# Patient Record
Sex: MALE | Race: WHITE | NOT HISPANIC OR LATINO | Employment: FULL TIME | URBAN - METROPOLITAN AREA
[De-identification: names, ages, dates, MRNs, and addresses within clinical notes are randomized per-mention and may not be internally consistent; named-entity substitution may affect disease eponyms.]

---

## 2022-06-02 ENCOUNTER — APPOINTMENT (OUTPATIENT)
Dept: ADMISSIONS | Facility: MEDICAL CENTER | Age: 62
End: 2022-06-02
Payer: COMMERCIAL

## 2022-06-06 ENCOUNTER — PRE-ADMISSION TESTING (OUTPATIENT)
Dept: ADMISSIONS | Facility: MEDICAL CENTER | Age: 62
End: 2022-06-06
Attending: ORTHOPAEDIC SURGERY
Payer: COMMERCIAL

## 2022-06-06 RX ORDER — FLUTICASONE PROPIONATE AND SALMETEROL 100; 50 UG/1; UG/1
1 POWDER RESPIRATORY (INHALATION) DAILY
COMMUNITY

## 2022-06-06 RX ORDER — ALBUTEROL SULFATE 90 UG/1
2 AEROSOL, METERED RESPIRATORY (INHALATION) EVERY 6 HOURS PRN
COMMUNITY

## 2022-06-06 NOTE — PREPROCEDURE INSTRUCTIONS
Pre-admit appointment completed.  Pt instructed to continue regularly prescribed medications through the day before surgery. Pt instructed to take the following medications the day of surgery with a sip of water, per anesthesia protocol; advair, and if needed-albuterol MDI (to bring DOS).     MET's=>4.

## 2022-06-17 ENCOUNTER — HOSPITAL ENCOUNTER (OUTPATIENT)
Facility: MEDICAL CENTER | Age: 62
End: 2022-06-17
Attending: ORTHOPAEDIC SURGERY | Admitting: ORTHOPAEDIC SURGERY
Payer: COMMERCIAL

## 2022-06-17 ENCOUNTER — ANESTHESIA (OUTPATIENT)
Dept: SURGERY | Facility: MEDICAL CENTER | Age: 62
End: 2022-06-17
Payer: COMMERCIAL

## 2022-06-17 ENCOUNTER — ANESTHESIA EVENT (OUTPATIENT)
Dept: SURGERY | Facility: MEDICAL CENTER | Age: 62
End: 2022-06-17
Payer: COMMERCIAL

## 2022-06-17 VITALS
HEIGHT: 69 IN | OXYGEN SATURATION: 94 % | TEMPERATURE: 97.5 F | HEART RATE: 75 BPM | WEIGHT: 200.62 LBS | SYSTOLIC BLOOD PRESSURE: 129 MMHG | BODY MASS INDEX: 29.71 KG/M2 | RESPIRATION RATE: 16 BRPM | DIASTOLIC BLOOD PRESSURE: 90 MMHG

## 2022-06-17 PROCEDURE — C1713 ANCHOR/SCREW BN/BN,TIS/BN: HCPCS | Performed by: ORTHOPAEDIC SURGERY

## 2022-06-17 PROCEDURE — 700111 HCHG RX REV CODE 636 W/ 250 OVERRIDE (IP): Performed by: INTERNAL MEDICINE

## 2022-06-17 PROCEDURE — 76942 ECHO GUIDE FOR BIOPSY: CPT | Mod: 26 | Performed by: INTERNAL MEDICINE

## 2022-06-17 PROCEDURE — 160029 HCHG SURGERY MINUTES - 1ST 30 MINS LEVEL 4: Performed by: ORTHOPAEDIC SURGERY

## 2022-06-17 PROCEDURE — 700105 HCHG RX REV CODE 258: Performed by: ORTHOPAEDIC SURGERY

## 2022-06-17 PROCEDURE — A9270 NON-COVERED ITEM OR SERVICE: HCPCS | Performed by: INTERNAL MEDICINE

## 2022-06-17 PROCEDURE — 64415 NJX AA&/STRD BRCH PLXS IMG: CPT | Performed by: ORTHOPAEDIC SURGERY

## 2022-06-17 PROCEDURE — 01638 ANES OPN/ARTHR TOT SHO RPLCM: CPT | Performed by: INTERNAL MEDICINE

## 2022-06-17 PROCEDURE — 64415 NJX AA&/STRD BRCH PLXS IMG: CPT | Mod: 59 | Performed by: INTERNAL MEDICINE

## 2022-06-17 PROCEDURE — C1762 CONN TISS, HUMAN(INC FASCIA): HCPCS | Performed by: ORTHOPAEDIC SURGERY

## 2022-06-17 PROCEDURE — 160046 HCHG PACU - 1ST 60 MINS PHASE II: Performed by: ORTHOPAEDIC SURGERY

## 2022-06-17 PROCEDURE — 160035 HCHG PACU - 1ST 60 MINS PHASE I: Performed by: ORTHOPAEDIC SURGERY

## 2022-06-17 PROCEDURE — 160002 HCHG RECOVERY MINUTES (STAT): Performed by: ORTHOPAEDIC SURGERY

## 2022-06-17 PROCEDURE — 160048 HCHG OR STATISTICAL LEVEL 1-5: Performed by: ORTHOPAEDIC SURGERY

## 2022-06-17 PROCEDURE — 700102 HCHG RX REV CODE 250 W/ 637 OVERRIDE(OP): Performed by: INTERNAL MEDICINE

## 2022-06-17 PROCEDURE — 700101 HCHG RX REV CODE 250: Performed by: INTERNAL MEDICINE

## 2022-06-17 PROCEDURE — 160041 HCHG SURGERY MINUTES - EA ADDL 1 MIN LEVEL 4: Performed by: ORTHOPAEDIC SURGERY

## 2022-06-17 PROCEDURE — 700111 HCHG RX REV CODE 636 W/ 250 OVERRIDE (IP): Performed by: ORTHOPAEDIC SURGERY

## 2022-06-17 PROCEDURE — 160009 HCHG ANES TIME/MIN: Performed by: ORTHOPAEDIC SURGERY

## 2022-06-17 PROCEDURE — 160025 RECOVERY II MINUTES (STATS): Performed by: ORTHOPAEDIC SURGERY

## 2022-06-17 PROCEDURE — 502000 HCHG MISC OR IMPLANTS RC 0278: Performed by: ORTHOPAEDIC SURGERY

## 2022-06-17 DEVICE — ANCHOR KNOTLESS REELX STT 4.5MM (5EA/BX): Type: IMPLANTABLE DEVICE | Site: SHOULDER | Status: FUNCTIONAL

## 2022-06-17 DEVICE — ANCHOR SUTURE OMEGA PEEK OD4.75 MM 2 KNOTLESS STERILE LATEX FREE: Type: IMPLANTABLE DEVICE | Site: SHOULDER | Status: FUNCTIONAL

## 2022-06-17 DEVICE — ANCHOR SUTURE ICONIX 3 WITH 3 STRANDS #2 FORCE FIBER 2.3MM (5EA/BX): Type: IMPLANTABLE DEVICE | Site: SHOULDER | Status: FUNCTIONAL

## 2022-06-17 DEVICE — GRAFT SOFT TISSUE DERMAL MATRIX ALLOMEND 4X8CM: Type: IMPLANTABLE DEVICE | Site: SHOULDER | Status: FUNCTIONAL

## 2022-06-17 DEVICE — IMPLANTABLE DEVICE: Type: IMPLANTABLE DEVICE | Site: SHOULDER | Status: FUNCTIONAL

## 2022-06-17 RX ORDER — LIDOCAINE HYDROCHLORIDE 20 MG/ML
INJECTION, SOLUTION EPIDURAL; INFILTRATION; INTRACAUDAL; PERINEURAL PRN
Status: DISCONTINUED | OUTPATIENT
Start: 2022-06-17 | End: 2022-06-17 | Stop reason: SURG

## 2022-06-17 RX ORDER — HYDROMORPHONE HYDROCHLORIDE 2 MG/ML
0.4 INJECTION, SOLUTION INTRAMUSCULAR; INTRAVENOUS; SUBCUTANEOUS
Status: DISCONTINUED | OUTPATIENT
Start: 2022-06-17 | End: 2022-06-17 | Stop reason: HOSPADM

## 2022-06-17 RX ORDER — ONDANSETRON 2 MG/ML
INJECTION INTRAMUSCULAR; INTRAVENOUS PRN
Status: DISCONTINUED | OUTPATIENT
Start: 2022-06-17 | End: 2022-06-17 | Stop reason: SURG

## 2022-06-17 RX ORDER — CEFAZOLIN SODIUM 1 G/3ML
INJECTION, POWDER, FOR SOLUTION INTRAMUSCULAR; INTRAVENOUS PRN
Status: DISCONTINUED | OUTPATIENT
Start: 2022-06-17 | End: 2022-06-17 | Stop reason: SURG

## 2022-06-17 RX ORDER — ROPIVACAINE HYDROCHLORIDE 5 MG/ML
INJECTION, SOLUTION EPIDURAL; INFILTRATION; PERINEURAL
Status: COMPLETED | OUTPATIENT
Start: 2022-06-17 | End: 2022-06-17

## 2022-06-17 RX ORDER — HYDROMORPHONE HYDROCHLORIDE 2 MG/ML
0.2 INJECTION, SOLUTION INTRAMUSCULAR; INTRAVENOUS; SUBCUTANEOUS
Status: DISCONTINUED | OUTPATIENT
Start: 2022-06-17 | End: 2022-06-17 | Stop reason: HOSPADM

## 2022-06-17 RX ORDER — MEPERIDINE HYDROCHLORIDE 50 MG/ML
12.5 INJECTION INTRAMUSCULAR; INTRAVENOUS; SUBCUTANEOUS
Status: DISCONTINUED | OUTPATIENT
Start: 2022-06-17 | End: 2022-06-17 | Stop reason: HOSPADM

## 2022-06-17 RX ORDER — DEXAMETHASONE SODIUM PHOSPHATE 4 MG/ML
INJECTION, SOLUTION INTRA-ARTICULAR; INTRALESIONAL; INTRAMUSCULAR; INTRAVENOUS; SOFT TISSUE PRN
Status: DISCONTINUED | OUTPATIENT
Start: 2022-06-17 | End: 2022-06-17 | Stop reason: SURG

## 2022-06-17 RX ORDER — ONDANSETRON 2 MG/ML
4 INJECTION INTRAMUSCULAR; INTRAVENOUS
Status: DISCONTINUED | OUTPATIENT
Start: 2022-06-17 | End: 2022-06-17 | Stop reason: HOSPADM

## 2022-06-17 RX ORDER — HALOPERIDOL 5 MG/ML
1 INJECTION INTRAMUSCULAR
Status: DISCONTINUED | OUTPATIENT
Start: 2022-06-17 | End: 2022-06-17 | Stop reason: HOSPADM

## 2022-06-17 RX ORDER — CELECOXIB 200 MG/1
200 CAPSULE ORAL ONCE
Status: COMPLETED | OUTPATIENT
Start: 2022-06-17 | End: 2022-06-17

## 2022-06-17 RX ORDER — DIPHENHYDRAMINE HYDROCHLORIDE 50 MG/ML
12.5 INJECTION INTRAMUSCULAR; INTRAVENOUS
Status: DISCONTINUED | OUTPATIENT
Start: 2022-06-17 | End: 2022-06-17 | Stop reason: HOSPADM

## 2022-06-17 RX ORDER — EPINEPHRINE 1 MG/ML(1)
AMPUL (ML) INJECTION
Status: DISCONTINUED | OUTPATIENT
Start: 2022-06-17 | End: 2022-06-17 | Stop reason: HOSPADM

## 2022-06-17 RX ORDER — ACETAMINOPHEN 500 MG
1000 TABLET ORAL ONCE
Status: COMPLETED | OUTPATIENT
Start: 2022-06-17 | End: 2022-06-17

## 2022-06-17 RX ORDER — SODIUM CHLORIDE, SODIUM LACTATE, POTASSIUM CHLORIDE, CALCIUM CHLORIDE 600; 310; 30; 20 MG/100ML; MG/100ML; MG/100ML; MG/100ML
INJECTION, SOLUTION INTRAVENOUS CONTINUOUS
Status: ACTIVE | OUTPATIENT
Start: 2022-06-17 | End: 2022-06-17

## 2022-06-17 RX ORDER — KETOROLAC TROMETHAMINE 30 MG/ML
INJECTION, SOLUTION INTRAMUSCULAR; INTRAVENOUS PRN
Status: DISCONTINUED | OUTPATIENT
Start: 2022-06-17 | End: 2022-06-17 | Stop reason: SURG

## 2022-06-17 RX ORDER — OXYCODONE HCL 5 MG/5 ML
5 SOLUTION, ORAL ORAL
Status: DISCONTINUED | OUTPATIENT
Start: 2022-06-17 | End: 2022-06-17 | Stop reason: HOSPADM

## 2022-06-17 RX ORDER — HYDROMORPHONE HYDROCHLORIDE 2 MG/ML
0.1 INJECTION, SOLUTION INTRAMUSCULAR; INTRAVENOUS; SUBCUTANEOUS
Status: DISCONTINUED | OUTPATIENT
Start: 2022-06-17 | End: 2022-06-17 | Stop reason: HOSPADM

## 2022-06-17 RX ORDER — PHENYLEPHRINE HYDROCHLORIDE 10 MG/ML
INJECTION, SOLUTION INTRAMUSCULAR; INTRAVENOUS; SUBCUTANEOUS PRN
Status: DISCONTINUED | OUTPATIENT
Start: 2022-06-17 | End: 2022-06-17 | Stop reason: SURG

## 2022-06-17 RX ORDER — OXYCODONE HCL 5 MG/5 ML
10 SOLUTION, ORAL ORAL
Status: DISCONTINUED | OUTPATIENT
Start: 2022-06-17 | End: 2022-06-17 | Stop reason: HOSPADM

## 2022-06-17 RX ORDER — MIDAZOLAM HYDROCHLORIDE 1 MG/ML
INJECTION INTRAMUSCULAR; INTRAVENOUS PRN
Status: DISCONTINUED | OUTPATIENT
Start: 2022-06-17 | End: 2022-06-17 | Stop reason: SURG

## 2022-06-17 RX ORDER — HYDRALAZINE HYDROCHLORIDE 20 MG/ML
5 INJECTION INTRAMUSCULAR; INTRAVENOUS
Status: DISCONTINUED | OUTPATIENT
Start: 2022-06-17 | End: 2022-06-17 | Stop reason: HOSPADM

## 2022-06-17 RX ORDER — ALBUTEROL SULFATE 2.5 MG/3ML
2.5 SOLUTION RESPIRATORY (INHALATION)
Status: DISCONTINUED | OUTPATIENT
Start: 2022-06-17 | End: 2022-06-17 | Stop reason: HOSPADM

## 2022-06-17 RX ADMIN — ONDANSETRON 4 MG: 2 INJECTION INTRAMUSCULAR; INTRAVENOUS at 11:58

## 2022-06-17 RX ADMIN — CELECOXIB 200 MG: 200 CAPSULE ORAL at 07:57

## 2022-06-17 RX ADMIN — MIDAZOLAM HYDROCHLORIDE 2 MG: 1 INJECTION, SOLUTION INTRAMUSCULAR; INTRAVENOUS at 09:38

## 2022-06-17 RX ADMIN — ACETAMINOPHEN 1000 MG: 500 TABLET ORAL at 07:57

## 2022-06-17 RX ADMIN — SODIUM CHLORIDE, POTASSIUM CHLORIDE, SODIUM LACTATE AND CALCIUM CHLORIDE: 600; 310; 30; 20 INJECTION, SOLUTION INTRAVENOUS at 07:54

## 2022-06-17 RX ADMIN — PHENYLEPHRINE HYDROCHLORIDE 100 MCG: 10 INJECTION INTRAVENOUS at 10:28

## 2022-06-17 RX ADMIN — PROPOFOL 200 MG: 10 INJECTION, EMULSION INTRAVENOUS at 09:53

## 2022-06-17 RX ADMIN — CEFAZOLIN 2 G: 330 INJECTION, POWDER, FOR SOLUTION INTRAMUSCULAR; INTRAVENOUS at 09:56

## 2022-06-17 RX ADMIN — DEXAMETHASONE SODIUM PHOSPHATE 8 MG: 4 INJECTION, SOLUTION INTRA-ARTICULAR; INTRALESIONAL; INTRAMUSCULAR; INTRAVENOUS; SOFT TISSUE at 09:53

## 2022-06-17 RX ADMIN — FENTANYL CITRATE 100 MCG: 50 INJECTION, SOLUTION INTRAMUSCULAR; INTRAVENOUS at 09:53

## 2022-06-17 RX ADMIN — FENTANYL CITRATE 50 MCG: 50 INJECTION, SOLUTION INTRAMUSCULAR; INTRAVENOUS at 09:38

## 2022-06-17 RX ADMIN — SODIUM CHLORIDE, POTASSIUM CHLORIDE, SODIUM LACTATE AND CALCIUM CHLORIDE: 600; 310; 30; 20 INJECTION, SOLUTION INTRAVENOUS at 11:02

## 2022-06-17 RX ADMIN — ROPIVACAINE HYDROCHLORIDE 25 ML: 5 INJECTION, SOLUTION EPIDURAL; INFILTRATION; PERINEURAL at 09:38

## 2022-06-17 RX ADMIN — KETOROLAC TROMETHAMINE 30 MG: 30 INJECTION, SOLUTION INTRAMUSCULAR at 11:58

## 2022-06-17 RX ADMIN — PHENYLEPHRINE HYDROCHLORIDE 100 MCG: 10 INJECTION INTRAVENOUS at 10:57

## 2022-06-17 RX ADMIN — LIDOCAINE HYDROCHLORIDE 50 MG: 20 INJECTION, SOLUTION EPIDURAL; INFILTRATION; INTRACAUDAL at 09:53

## 2022-06-17 ASSESSMENT — PAIN DESCRIPTION - PAIN TYPE: TYPE: SURGICAL PAIN

## 2022-06-17 NOTE — ANESTHESIA POSTPROCEDURE EVALUATION
Patient: Car Dunbar    Procedure Summary     Date: 06/17/22 Room / Location:  OR  / SURGERY [facility]    Anesthesia Start: 0948 Anesthesia Stop: 1217    Procedures:       ARTHROSCOPY, SHOULDER, WITH EXTENSIVE DEBRIDEMENT (Right Shoulder)      DECOMPRESSION, SHOULDER, ARTHROSCOPIC - SUBACROMIAL- SUPERIOR CAPSULAR RECONSTRUCTION (Right Shoulder)      ARTHROSCOPY, SHOULDER, WITH BICEPS TENODESIS (Right Shoulder)      ARTHROSCOPY, SHOULDER, WITH ROTATOR CUFF REPAIR (Right Shoulder) Diagnosis: (RIGHT SHOULDER RC TEAR)    Surgeons: Rebekah Spicer M.D. Responsible Provider: Avelino Lucio M.D.    Anesthesia Type: general, peripheral nerve block ASA Status: 2          Final Anesthesia Type: general, peripheral nerve block  Last vitals  BP   Blood Pressure: 133/84    Temp   36.6 °C (97.9 °F)    Pulse   74   Resp   18    SpO2   91 %      Anesthesia Post Evaluation    Patient location during evaluation: PACU  Patient participation: complete - patient participated  Level of consciousness: awake and alert    Airway patency: patent  Anesthetic complications: no  Cardiovascular status: hemodynamically stable  Respiratory status: acceptable  Hydration status: euvolemic    PONV: none          No complications documented.     Nurse Pain Score: 0 (NPRS)

## 2022-06-17 NOTE — ANESTHESIA TIME REPORT
Anesthesia Start and Stop Event Times     Date Time Event    6/17/2022 0937 Ready for Procedure     0948 Anesthesia Start     1217 Anesthesia Stop        Responsible Staff  06/17/22    Name Role Begin End    Avelino Lucio M.D. Anesth 0948 1217        Overtime Reason:  no overtime (within assigned shift)    Comments:

## 2022-06-17 NOTE — OP REPORT
DATE OF SERVICE:  06/17/2022     PREOPERATIVE DIAGNOSES:  Right shoulder impingement syndrome, large retracted   rotator cuff tear, SLAP lesion.     POSTOPERATIVE DIAGNOSES:  Right shoulder impingement syndrome, large retracted   rotator cuff tear, SLAP lesion.     PROCEDURES:  Right shoulder arthroscopy, subacromial decompression, labral   debridement, arthroscopic proximal biceps tenodesis, arthroscopic rotator cuff   repair with superior capsular reconstruction using acellular dermal   allograft.     SURGEON:  Rebekah Spicer MD     ASSISTANT:  Guido Boles CFA     ANESTHESIOLOGIST:  Avelino Lucio MD     TYPE OF ANESTHESIA:  General, with preoperative interscalene nerve block.     INTRAVENOUS FLUID:  1 liter crystalloid.     ESTIMATED BLOOD LOSS:  Minimal.     DRAINS:  None.     SPECIMENS:  None.     COMPLICATIONS:  None.     IMPLANTS:  Conifer Iconix anchors x2, ReelX anchors x3, Omega anchors x2,   alpha vent anchor x1, AlloSource ultra thick acellular dermal matrix.     REASON FOR PROCEDURE:  The patient is a 61-year-old male who sustained an   acute injury to his right shoulder earlier this year.  He continued to have   pain and weakness.  We decided to proceed with arthroscopy due to the large   retracted rotator cuff tear visible on plain x-ray.     OPERATION:  The patient was given a right interscalene nerve block by the   anesthesiologist before surgery.  Once back in the operating room, a breathing   tube was placed.  He was given 2 grams of IV Ancef.  He was placed in the   lateral decubitus position.  Care was taken to pad his axilla as well as all   bony prominences.  The right shoulder was then examined under anesthesia.  He   was noted to have mildly restricted range of motion.  With a gentle   manipulation, the range of motion improved to normal.  There was no   instability to the shoulder.  The right upper extremity was then prepped with   ChloraPrep and draped in standard sterile fashion.   It was then placed in the   Arthrex traction device.  Bony landmarks were drawn and a standard posterior   portal was established.  The arthroscope was then inserted into the   glenohumeral joint.  An anterosuperior cannula was placed in the rotator   interval, just beneath the biceps tendon.  A probe was inserted.  The   glenohumeral joint cartilage was essentially normal.  There was a superior   labral fraying with a widely exposed proximal biceps tendon through the large   superior rotator cuff tear.  The labrum was debrided superiorly.  The   subscapularis was noted to be intact.  The posterior superior labrum was   lightly debrided.  The arthroscope was then inserted into the subacromial   space.  A lateral portal was established.  There was a copious amount of   thickened, inflamed bursal tissue.  This was removed with the shaver.  Borders   of the acromion were defined.  A 5.5 mm resector was used to remove the   anterior curve as well as lateral edge.  Portals were switched.  Using a   standard cutting block technique from posterior to anterior, a subacromial   decompression was completed.  This created a nice, smooth surface to the   undersurface of the acromion.  Care was taken not to interrupt the integrity   of the coracoacromial ligament.  Next, the rotator cuff was inspected.  The   infraspinatus could be advanced by approximately 1 cm and repaired; however,   there was a significant defect involving the entire supraspinatus.  The tissue   was friable, and not reducible.  Thus, the decision was made to perform an   infraspinatus repair as well as a superior capsular reconstruction.  The   superior rim of the glenoid as well as the greater tuberosity were both   roughened to create a healing response for the graft and for the repair.  With   the arthroscope in the posterior portal, the biceps tendon was addressed   first.  The groove itself was roughened and then tenodesed with two 1.4 mm   luggage tag  sutures.  All four tails were placed into a ReelX anchor, which   was tapped, then expanded, nicely compressing the tendon down to the prepared   bone.  The biceps tendon itself was released and the proximal segment   debrided.  The superior rim of the glenoid was prepared by removing the soft   tissue off of it.  The biceps tendon stump was debrided.  Next, the   arthroscope was placed through the posterior portal.  The lateral aspect of   the acromioplasty was completed.  The decision was made to place the glenoid   anchors first.  This was done by viewing from the lateral portal.  Two   additional percutaneous portals were established and the 12-degree guide for   the Iconix anchors was used to place an anterior then a posterior glenoid   anchor.  The posterior glenoid anchor was placed through the Neviaser portal.    At this point, the arthroscope was placed through the posterior portal.    Omega anchors were loaded with 2 mm tapes and placed on the medial aspect of   the greater tuberosity.  At this point, the sides of this quadrilateral   structure created by connecting all four points of the anchors were measured.    The measurements were written on a white board at the site of the room.    Attention was then turned to the graft itself.  The graft was measured and cut   to size, allowing for a 5 mm border peripherally and a 10 mm border of the   greater tuberosity, at its lateral aspect.  A bullfrog cannula was placed   laterally.  Sutures from all four of the anchors were then pulled out of the   lateral cannula.  They were then placed with a Tuohy needle and a wire lasso   through the graft. Using an anchor length double pulley technique, the medial   aspect of the graft was tied down and secured along the medial glenoid neck.    The tapes were then crossed and placed into a posterolateral ReelX anchor   followed by an anterolateral ReelX anchor.  At this point, the infraspinatus   was repaired with a  triple loaded alpha vent anchor placed just posterior to   the posterior rim of the graft.  The Cobra device was used to pass the 1.4 mm   sutures.  These were tied down individually, a simple suture first followed by   Doroteo.  Next, a side-to-side suture was taken with a #2 suture.  This   was done posteriorly.  An additional anterior side-to-side suture was taken   with a #2. This allowed for complete humeral head coverage, and for a well-   tensioned graft.  All instruments were then removed.  Portals were closed with   3-0 nylon suture.  A sterile dressing was applied.  All drapes were removed   and the arm was carefully taken out of traction and placed into a shoulder   abduction sling.  The patient was placed supine on a stretcher and taken to   recovery room, in stable condition.        ______________________________  MD AGNIESZKA HORNER/JORDANA    DD:  06/17/2022 12:18  DT:  06/17/2022 12:54    Job#:  [id number]

## 2022-06-17 NOTE — ANESTHESIA PREPROCEDURE EVALUATION
Case: [id number] Date/Time: 06/17/22 0945    Procedures:       ARTHROSCOPY, SHOULDER, WITH EXTENSIVE DEBRIDEMENT (Right )      DECOMPRESSION, SHOULDER, ARTHROSCOPIC - SUBACROMIAL      ARTHROSCOPY, SHOULDER, WITH BICEPS TENODESIS - POSSIBLE      ARTHROSCOPY, SHOULDER, WITH ROTATOR CUFF REPAIR      ARTHROSCOPY, SHOULDER, WITH SLAP LESION REPAIR - POSSIBLE      ARTHROPLASTY,BALLOON - POSSIBLE AND REPAIRS AS INDICATED    Pre-op diagnosis: RIGHT SHOULDER RC TEAR    Location:  OR  / SURGERY [facility]    Surgeons: Rebekah Spicer M.D.          Relevant Problems   No relevant active problems       Physical Exam    Airway   Mallampati: II  TM distance: >3 FB  Neck ROM: full       Cardiovascular - normal exam  Rhythm: regular  Rate: normal  (-) murmur     Dental - normal exam           Pulmonary - normal exam  Breath sounds clear to auscultation     Abdominal    Neurological - normal exam                 Anesthesia Plan    ASA 2       Plan - general and peripheral nerve block     Peripheral nerve block will be post-op pain control  Airway plan will be LMA          Induction: intravenous    Postoperative Plan: Postoperative administration of opioids is intended.    Pertinent diagnostic labs and testing reviewed    Informed Consent:    Anesthetic plan and risks discussed with patient.    Use of blood products discussed with: patient whom consented to blood products.

## 2022-06-17 NOTE — OR NURSING
1215: Patient arrived from OR to PACU via bed. VSS on 6 L mask. Dressing to right shoulder clean, dry and intact. Sling in place. Cold pack placed.     1230: Patient alert and oriented x4. Denies pain. Tolerating water at this time, denies nausea. Patient placed on room air.    1250: Patients daughter updated.     1305: Patient meets criteria to transfer to stage 2. Report called to Yuki PEACOCK.     1309: Patient transported to stage 2 via gurney by CNA.

## 2022-06-17 NOTE — OR SURGEON
Immediate Post OP Note    PreOp Diagnosis: RIGHT shoulder impingement, RCT, SLAP lesion      PostOp Diagnosis: SAME       Procedure(s):  RIGHT  ARTHROSCOPY, SHOULDER, WITH EXTENSIVE DEBRIDEMENT - Wound Class: Clean  DECOMPRESSION, SHOULDER, ARTHROSCOPIC - SUBACROMIAL- SUPERIOR CAPSULAR RECONSTRUCTION - Wound Class: Clean  ARTHROSCOPY, SHOULDER, WITH BICEPS TENODESIS - Wound Class: Clean  ARTHROSCOPY, SHOULDER, WITH ROTATOR CUFF REPAIR - Wound Class: Clean    Surgeon(s):  Rebekah Spicer M.D.    Anesthesiologist/Type of Anesthesia:  Anesthesiologist: Avelino Lucio M.D./General    Surgical Staff:  Circulator: Gissel Jasso R.N.  Scrub Person: Eloise Baldwin  First Assist: Guido Boles, C.N.AAlexsandra    Specimens removed if any:  * No specimens in log *    Estimated Blood Loss: min    Findings: as above    Complications: none    Nii        6/17/2022 12:06 PM Rebekah Spicer M.D.

## 2022-06-17 NOTE — DISCHARGE INSTRUCTIONS
ACTIVITY: Rest and take it easy for the first 24 hours.  A responsible adult is recommended to remain with you during that time.  It is normal to feel sleepy.  We encourage you to not do anything that requires balance, judgment or coordination.    MILD FLU-LIKE SYMPTOMS ARE NORMAL. YOU MAY EXPERIENCE GENERALIZED MUSCLE ACHES, THROAT IRRITATION, HEADACHE AND/OR SOME NAUSEA.    FOR 24 HOURS DO NOT:  Drive, operate machinery or run household appliances.  Drink beer or alcoholic beverages.   Make important decisions or sign legal documents.    SPECIAL INSTRUCTIONS:   Post-Operative Instructions - Shoulder Arthroscopy   Dressing and wound care: Keep your shoulder dressing clean and dry after surgery.  Be aware that some leaking of blood or fluid from your dressing can occur and is normal. You may remove your dressing 3 days after the operation.  Notice that you have a single incision and/or several small incisions that have been closed with sutures. Cover each of these incisions with a light dressing or band-aids.  This keeps the surgical incisions clean, as well as preventing your clothes from spotting with blood or fluid.  Change band-aids or light dressing daily.  Shower / bathing: Keep the shoulder dry for 3 days after your surgery.  Then, you may shower. You may let soap and water run over skin incisions, but do not immerse your shoulder in water.  No swimming pools, hot tubs, or baths are recommended until at least 6 weeks after surgery.  Ice: Apply an ice pack to your shoulder (15 minutes on the shoulder, 15 minutes off the shoulder), as you feel necessary to help with the pain and swelling.   If you have a cold therapy, use liberally as directed.   Sling / Shoulder Immobilizer: The sling should be on except when bathing or doing your exercises.  You may also carefully remove the sling when awake and seated.  Be sure to support your forearm with a pillow so your shoulder remains in a relaxed and  comfortable position.  Please wear the sling while sleeping.   Activity: It is important to move your shoulder, as well as your elbow, wrist, and hand several times daily, starting the day after surgery.  You may do pendulum exercises with your operative arm starting the day after surgery.  Pendulum (dangling [place]) exercises are encouraged 3-5 times daily.  The sling will need to be removed for pendulum exercises.  You may loosen the sling to use your operative arm for light simple activities such as eating, brushing teeth, or writing/typing.  Nothing heavier than a cup of coffee in your hand on the operative side until otherwise recommended.   Pain medication: Take your pain medicine, as needed and prescribed.  Do not drive or operate machinery while taking narcotic pain medication.   You may start or resume anti-inflammatory medication (i.e. ibuprofen, naproxen) anytime after surgery, which should be taken with food to avoid stomach irritation.   Driving: You may drive as soon as you are off narcotic pain medication and feel comfortable behind the wheel.  Loosen or remove your sling, supporting your forearm on a pillow when you drive.  It is important for both hands to have access to the steering wheel for safety.   Aspirin: Take one low dose 81 mg aspirin starting the morning after surgery twice daily - one in the morning and one in the evening - for two weeks following surgery.  Problems: If you are having problems with your shoulder (unexpected pain, excessive bleeding or discharge from your incisions, fevers/chills) do not hesitate to call the office or visit the nearest emergency room for evaluation.  Follow-up: Make sure that you have an appointment 7-14 days following surgery.  Your stitches will be removed, and your procedure/rehabilitation will be discussed at that time.   Physical therapy may be prescribed at that time. Rebekah Spicer MD [facility] [phone]    DIET: To avoid nausea,  slowly advance diet as tolerated, avoiding spicy or greasy foods for the first day.  Add more substantial food to your diet according to your physician's instructions.  Babies can be fed formula or breast milk as soon as they are hungry.  INCREASE FLUIDS AND FIBER TO AVOID CONSTIPATION.        FOLLOW-UP APPOINTMENT:  A follow-up appointment should be arranged with your doctor; call to schedule.    You should CALL YOUR PHYSICIAN if you develop:  Fever greater than 101 degrees F.  Pain not relieved by medication, or persistent nausea or vomiting.  Excessive bleeding (blood soaking through dressing) or unexpected drainage from the wound.  Extreme redness or swelling around the incision site, drainage of pus or foul smelling drainage.  Inability to urinate or empty your bladder within 8 hours.  Problems with breathing or chest pain.    You should call 911 if you develop problems with breathing or chest pain.  If you are unable to contact your doctor or surgical center, you should go to the nearest emergency room or urgent care center.  Physician's telephone #: [phone]    If any questions arise, call your doctor.  If your doctor is not available, please feel free to call the Surgical Center at [phone].     A registered nurse may call you a few days after your surgery to see how you are doing after your procedure.    MEDICATIONS: Resume taking daily medication.  Take prescribed pain medication with food.  If no medication is prescribed, you may take non-aspirin pain medication if needed.  PAIN MEDICATION CAN BE VERY CONSTIPATING.  Take a stool softener or laxative such as senokot, pericolace, or milk of magnesia if needed.        Peripheral Nerve Block Discharge Instructions from Same Day Surgery and Inpatient :      What to Expect - Upper Extremity  You may experience numbness and weakness in shoulder  on the same side as your surgery  This is normal. For some people, this may be an unpleasant sensation. Be  "very careful with your numb limb  Ask for help when you need it  Shoulder Surgery Side Effects  In addition to numbness and weakness you may experience other symptoms  Other nerves that are close to those nerves injected can also be affected by local anesthesia  You may experience a hoarseness in your voice  Your breathing may feel different  You may also notice drooping of your eyelid, pupil constriction, and decreased sweating, on the side of your surgery  All of these side effects are normal and will resolve when the local anesthetic wears off   Prevent Injury  Protect the limb like a baby  Beware of exposing your limb to extreme heat or cold or trauma  The limb may be injured without you noticing because it is numb  Keep the limb elevated whenever possible  Do not sleep on the limb  Change the position of the limb regularly  Avoid putting pressure on your surgical limb  Pain Control  The initial block on the day of surgery will make your extremity feel \"numb\"  Any consecutive injection including prior to discharge from the hospital will make your extremity feel \"numb\"  You may feel an aching or burning when the local anesthesia starts to wear off  Take pain pills as prescribed by your surgeon  Call your surgeon or anesthesiologist if you do not have adequate pain control      If your physician has prescribed pain medication that includes Acetaminophen (Tylenol), do not take additional Acetaminophen (Tylenol) while taking the prescribed medication.    Depression / Suicide Risk    As you are discharged from this [facility] facility, it is important to learn how to keep safe from harming yourself.    Recognize the warning signs:  Abrupt changes in personality, positive or negative- including increase in energy   Giving away possessions  Change in eating patterns- significant weight changes-  positive or negative  Change in sleeping patterns- unable to sleep or sleeping all the time   Unwillingness or inability " to communicate  Depression  Unusual sadness, discouragement and loneliness  Talk of wanting to die  Neglect of personal appearance   Rebelliousness- reckless behavior  Withdrawal from people/activities they love  Confusion- inability to concentrate     If you or a loved one observes any of these behaviors or has concerns about self-harm, here's what you can do:  Talk about it- your feelings and reasons for harming yourself  Remove any means that you might use to hurt yourself (examples: pills, rope, extension cords, firearm)  Get professional help from the community (Mental Health, Substance Abuse, psychological counseling)  Do not be alone:Call your Safe Contact- someone whom you trust who will be there for you.  Call your local CRISIS HOTLINE 040-1905 or 085-867-3617  Call your local Children's Mobile Crisis Response Team [place] (452) 023-4735 or www.MiniBanda.ru  Call the toll free National Suicide Prevention Hotlines   National Suicide Prevention Lifeline 290-194-OROI (8995)  National Hope Line Network 800-SUICIDE (521-0090)

## 2022-06-17 NOTE — ANESTHESIA PROCEDURE NOTES
Peripheral Block    Date/Time: 6/17/2022 9:38 AM  Performed by: Avelino Lucio M.D.  Authorized by: Avelino Lucio M.D.     Patient Location:  Pre-op  Start Time:  6/17/2022 9:38 AM  End Time:  6/17/2022 9:40 AM  Reason for Block: at surgeon's request and post-op pain management ONLY    patient identified, IV checked, site marked, risks and benefits discussed, surgical consent, monitors and equipment checked, pre-op evaluation and timeout performed    Patient Position:  Supine  Prep: ChloraPrep    Monitoring:  Heart rate, continuous pulse ox and cardiac monitor  Block Region:  Upper Extremity  Upper Extremity - Block Type:  BRACHIAL PLEXUS block, Interscalene approach    Laterality:  Right  Procedures: ultrasound guided  Image captured, interpreted and electronically stored.  Local Infiltration:  Lidocaine  Strength:  1 %  Dose:  3 ml  Block Type:  Single-shot  Needle Length:  50mm  Needle Gauge:  22 G  Needle Localization:  Ultrasound guidance  Injection Assessment:  Negative aspiration for heme, no paresthesia on injection, incremental injection and local visualized surrounding nerve on ultrasound  Evidence of intravascular injection: No     US Guided Interscalene Brachial Plexus Block   US transducer placed on the neck in oblique plane approximately at the level of C6.  Anterior and Middle Scalene (MSM) muscles identified with nerve trunks identified between the muscles.  Needle inserted lateral to probe and advanced under direct visualization through the MSM into a perineural position.  After negative aspiration, LA injected with ease and visualized surrounding the nerve trunks.

## 2022-06-17 NOTE — ANESTHESIA PROCEDURE NOTES
Airway    Date/Time: 6/17/2022 9:53 AM  Performed by: Avelino Lucio M.D.  Authorized by: Avelino Lucio M.D.     Location:  OR  Urgency:  Elective  Indications for Airway Management:  Anesthesia      Spontaneous Ventilation: absent    Sedation Level:  Deep  Preoxygenated: Yes    Mask Difficulty Assessment:  0 - not attempted  Final Airway Type:  Supraglottic airway  Final Supraglottic Airway:  Standard LMA    SGA Size:  4  Number of Attempts at Approach:  1

## 2022-06-17 NOTE — OR NURSING
0809: Patient allergies and NPO status verified, home medication reconciliation completed and belongings secured. Patient verbalizes understanding of pain scale, expected course of stay and plan of care. Surgical site verified with patient. IV access established. Sequentials placed on legs. Triple aim completed by CNA.

## 2022-06-17 NOTE — OR NURSING
1309- Patient arrived to phase 2. Patient changed out of surgical gown into clothes. Patient is A&Ox4. Patient reports no pain and no nausea. Numbness reported in the right hand. Vital signs taken and patient assessed. Asked the patient if they had to use the bathroom. The patient was taken to the bathroom and he voided.    IV removed and discharge instructions read. All questions answered. Discharged to care of responsible adult. Wheeled out to car by CNA.

## (undated) DEVICE — Device

## (undated) DEVICE — SUTURE GENERAL

## (undated) DEVICE — GLOVE, LITE (PAIR)

## (undated) DEVICE — SUCTION INSTRUMENT YANKAUER BULBOUS TIP W/O VENT (50EA/CA)

## (undated) DEVICE — CHLORAPREP 26 ML APPLICATOR - ORANGE TINT(25/CA)

## (undated) DEVICE — NEPTUNE 4 PORT MANIFOLD - (20/PK)

## (undated) DEVICE — ELECTRODE 850 FOAM ADHESIVE - HYDROGEL RADIOTRNSPRNT (50/PK)

## (undated) DEVICE — SHAVER, 5.5 RESECTOR

## (undated) DEVICE — SODIUM CHL. IRRIGATION 0.9% 3000ML (4EA/CA 65CA/PF)

## (undated) DEVICE — BAG SPONGE COUNT 10.25 X 32 - BLUE (250/CA)

## (undated) DEVICE — MASK ANESTHESIA ADULT  - (100/CA)

## (undated) DEVICE — SHAVER4.0 AGGRESSIVE + FORMLA (5EA/BX)

## (undated) DEVICE — MASK AIRWAY SIZE 4 UNIQUE SILICON (10EA/BX)

## (undated) DEVICE — PACK SHOULDER ARTHROSCOPY SM - (2EA/CA)

## (undated) DEVICE — PROTECTOR ULNA NERVE - (36PR/CA)

## (undated) DEVICE — DRAPE SHOULDER FLUID CONTROL - 77 X 85 (10/CA)

## (undated) DEVICE — LACTATED RINGERS INJ 1000 ML - (14EA/CA 60CA/PF)

## (undated) DEVICE — HUMID-VENT HEAT AND MOISTURE EXCHANGE- (50/BX)

## (undated) DEVICE — SLEEVE SHOULDER DISP(ARTHREX) - (6/BX)

## (undated) DEVICE — CANNULA THREADED 5X75 (5EA/BX)

## (undated) DEVICE — SYRINGE ASEPTO - (50EA/CA

## (undated) DEVICE — GLOVE BIOGEL SZ 7 SURGICAL PF LTX - (50PR/BX 4BX/CA)

## (undated) DEVICE — GOWN SURGICAL X-LARGE ULTRA - FILM-REINFORCED (20/CA)

## (undated) DEVICE — TOWELS CLOTH SURGICAL - (4/PK 20PK/CA)

## (undated) DEVICE — SUTURE 2.0MM TAPE VIOLET MUST ORDER 12 EACH AT A TIME

## (undated) DEVICE — TUBE CONNECTING SUCTION - CLEAR PLASTIC STERILE 72 IN (50EA/CA)

## (undated) DEVICE — TUBING DAY USE W/CARTRIDGE (10EA/BX)

## (undated) DEVICE — WATER IRRIGATION STERILE 1000ML (12EA/CA)

## (undated) DEVICE — KIT ANESTHESIA W/CIRCUIT & 3/LT BAG W/FILTER (20EA/CA)

## (undated) DEVICE — TOWEL STOP TIMEOUT SAFETY FLAG (40EA/CA)

## (undated) DEVICE — SENSOR OXIMETER ADULT SPO2 RD SET (20EA/BX)

## (undated) DEVICE — SODIUM CHL IRRIGATION 0.9% 1000ML (12EA/CA)

## (undated) DEVICE — DRAPE U SPLIT IMP 54 X 76 - (24/CA)

## (undated) DEVICE — SUTURE XPASS STIFFENED 12IN (6EA/BX)

## (undated) DEVICE — DRAPE IOBAN II INCISE 23X17 - (10EA/BX 4BX/CA)

## (undated) DEVICE — CANNULA FULLY THREADED 8 X 75 (5EA/BX)

## (undated) DEVICE — ELECTRODE DUAL RETURN W/ CORD - (50/PK)

## (undated) DEVICE — GOWN WARMING STANDARD FLEX - (30/CA)

## (undated) DEVICE — SUTURE 3-0 ETHILON FS-1 - (36/BX) 30 INCH

## (undated) DEVICE — GLOVE 7.0 LF PF PROTEXIS (50PR/BX)

## (undated) DEVICE — TUBING CASSETTE CROSSFLOW INTEGRATED (10EA/CA)

## (undated) DEVICE — ABLATOR WAND SERFAS 90-S CRUISE

## (undated) DEVICE — GATEWAY CANNULA 8MM X 30MM

## (undated) DEVICE — HEAD HOLDER JUNIOR/ADULT

## (undated) DEVICE — NEEDLE W/FACET TIP DULL VERSION W/STIMULATION CABLE SONOPLEX 21G X 4 (10/EA)"

## (undated) DEVICE — NEEDLE SLIDER CHAMPION 45 DEG RIGHT SHUTTLE NITINOL (5EA/BX)

## (undated) DEVICE — CANISTER SUCTION RIGID RED 1500CC (40EA/CA)